# Patient Record
Sex: MALE | Race: WHITE | NOT HISPANIC OR LATINO | Employment: FULL TIME | URBAN - METROPOLITAN AREA
[De-identification: names, ages, dates, MRNs, and addresses within clinical notes are randomized per-mention and may not be internally consistent; named-entity substitution may affect disease eponyms.]

---

## 2023-04-01 DIAGNOSIS — I10 ESSENTIAL (PRIMARY) HYPERTENSION: ICD-10-CM

## 2023-04-04 RX ORDER — LISINOPRIL 20 MG/1
TABLET ORAL
Qty: 30 TABLET | Refills: 1 | Status: SHIPPED | OUTPATIENT
Start: 2023-04-04 | End: 2023-06-02

## 2023-06-02 DIAGNOSIS — I10 ESSENTIAL (PRIMARY) HYPERTENSION: ICD-10-CM

## 2023-06-02 RX ORDER — LISINOPRIL 20 MG/1
TABLET ORAL
Qty: 30 TABLET | Refills: 1 | Status: SHIPPED | OUTPATIENT
Start: 2023-06-02 | End: 2023-08-07

## 2023-08-05 DIAGNOSIS — I10 ESSENTIAL (PRIMARY) HYPERTENSION: ICD-10-CM

## 2023-08-07 RX ORDER — LISINOPRIL 20 MG/1
TABLET ORAL
Qty: 30 TABLET | Refills: 0 | Status: SHIPPED | OUTPATIENT
Start: 2023-08-07 | End: 2023-09-11

## 2023-09-11 DIAGNOSIS — I10 ESSENTIAL (PRIMARY) HYPERTENSION: ICD-10-CM

## 2023-09-11 RX ORDER — LISINOPRIL 20 MG/1
TABLET ORAL
Qty: 30 TABLET | Refills: 0 | Status: SHIPPED | OUTPATIENT
Start: 2023-09-11 | End: 2023-10-09

## 2023-09-20 ENCOUNTER — APPOINTMENT (OUTPATIENT)
Dept: PRIMARY CARE | Facility: CLINIC | Age: 48
End: 2023-09-20
Payer: COMMERCIAL

## 2023-10-04 ENCOUNTER — OFFICE VISIT (OUTPATIENT)
Dept: PRIMARY CARE | Facility: CLINIC | Age: 48
End: 2023-10-04
Payer: COMMERCIAL

## 2023-10-04 ENCOUNTER — LAB (OUTPATIENT)
Dept: LAB | Facility: LAB | Age: 48
End: 2023-10-04
Payer: COMMERCIAL

## 2023-10-04 VITALS — DIASTOLIC BLOOD PRESSURE: 88 MMHG | SYSTOLIC BLOOD PRESSURE: 124 MMHG

## 2023-10-04 DIAGNOSIS — Z23 NEED FOR INFLUENZA VACCINATION: Primary | ICD-10-CM

## 2023-10-04 DIAGNOSIS — Z13.220 LIPID SCREENING: ICD-10-CM

## 2023-10-04 DIAGNOSIS — I10 PRIMARY HYPERTENSION: ICD-10-CM

## 2023-10-04 DIAGNOSIS — R09.82 PND (POST-NASAL DRIP): ICD-10-CM

## 2023-10-04 LAB
ANION GAP SERPL CALC-SCNC: 11 MMOL/L (ref 10–20)
BUN SERPL-MCNC: 24 MG/DL (ref 6–23)
CALCIUM SERPL-MCNC: 9.1 MG/DL (ref 8.6–10.3)
CHLORIDE SERPL-SCNC: 106 MMOL/L (ref 98–107)
CHOLEST SERPL-MCNC: 179 MG/DL (ref 0–199)
CHOLESTEROL/HDL RATIO: 5.6
CO2 SERPL-SCNC: 25 MMOL/L (ref 21–32)
CREAT SERPL-MCNC: 1.28 MG/DL (ref 0.5–1.3)
GFR SERPL CREATININE-BSD FRML MDRD: 69 ML/MIN/1.73M*2
GLUCOSE SERPL-MCNC: 93 MG/DL (ref 74–99)
HDLC SERPL-MCNC: 32.2 MG/DL
LDLC SERPL CALC-MCNC: 120 MG/DL (ref 140–190)
NON HDL CHOLESTEROL: 147 MG/DL (ref 0–149)
POTASSIUM SERPL-SCNC: 4.1 MMOL/L (ref 3.5–5.3)
SODIUM SERPL-SCNC: 138 MMOL/L (ref 136–145)
TRIGL SERPL-MCNC: 132 MG/DL (ref 0–149)
VLDL: 26 MG/DL (ref 0–40)

## 2023-10-04 PROCEDURE — 36415 COLL VENOUS BLD VENIPUNCTURE: CPT

## 2023-10-04 PROCEDURE — 99214 OFFICE O/P EST MOD 30 MIN: CPT | Performed by: NURSE PRACTITIONER

## 2023-10-04 PROCEDURE — 90686 IIV4 VACC NO PRSV 0.5 ML IM: CPT | Performed by: NURSE PRACTITIONER

## 2023-10-04 PROCEDURE — 3079F DIAST BP 80-89 MM HG: CPT | Performed by: NURSE PRACTITIONER

## 2023-10-04 PROCEDURE — 90471 IMMUNIZATION ADMIN: CPT | Performed by: NURSE PRACTITIONER

## 2023-10-04 PROCEDURE — 3074F SYST BP LT 130 MM HG: CPT | Performed by: NURSE PRACTITIONER

## 2023-10-04 RX ORDER — FLUTICASONE PROPIONATE 50 MCG
2 SPRAY, SUSPENSION (ML) NASAL DAILY
Qty: 16 G | Refills: 1 | Status: SHIPPED | OUTPATIENT
Start: 2023-10-04 | End: 2024-01-02

## 2023-10-04 NOTE — PROGRESS NOTES
Subjective   Patient ID: Yoshi Wright is a 48 y.o. male who presents for No chief complaint on file..  No home B/Ps - will resume meds  Some chronic pnd, not new, will send to ENT for eval as bothersome.            Review of Systems   Constitutional:  Negative for fatigue and fever.   HENT:  Positive for postnasal drip. Negative for sinus pressure and sinus pain.    Respiratory:  Negative for cough, shortness of breath and wheezing.    Cardiovascular:  Negative for leg swelling.   Endocrine: Negative for polydipsia, polyphagia and polyuria.   Neurological:  Negative for light-headedness and headaches.       Objective   Physical Exam  Vitals reviewed.   Constitutional:       Appearance: Normal appearance.   HENT:      Nose:      Comments: Some swelling turbinates, clear pnd evident, no sinus POP  Eyes:      Extraocular Movements: Extraocular movements intact.   Cardiovascular:      Rate and Rhythm: Normal rate and regular rhythm.   Pulmonary:      Effort: Pulmonary effort is normal.      Breath sounds: Normal breath sounds.   Musculoskeletal:      Right lower leg: No edema.      Left lower leg: No edema.   Skin:     Capillary Refill: Capillary refill takes less than 2 seconds.   Neurological:      General: No focal deficit present.      Mental Status: He is alert.   Psychiatric:         Mood and Affect: Mood normal.         Assessment/Plan   Diagnoses and all orders for this visit:  Need for influenza vaccination  -     Flu vaccine (IIV4) age 6 months and greater, preservative free  Lipid screening  -     Lipid Panel; Future  Primary hypertension  -     Basic Metabolic Panel; Future  PND (post-nasal drip)  -     fluticasone (Flonase) 50 mcg/actuation nasal spray; Administer 2 sprays into each nostril once daily. Shake gently. Before first use, prime pump. After use, clean tip and replace cap.

## 2023-10-06 DIAGNOSIS — I10 ESSENTIAL (PRIMARY) HYPERTENSION: ICD-10-CM

## 2023-10-09 RX ORDER — LISINOPRIL 20 MG/1
TABLET ORAL
Qty: 90 TABLET | Refills: 1 | Status: SHIPPED | OUTPATIENT
Start: 2023-10-09 | End: 2024-04-22

## 2023-10-31 PROBLEM — I10 PRIMARY HYPERTENSION: Status: ACTIVE | Noted: 2023-10-31

## 2023-10-31 PROBLEM — R09.82 PND (POST-NASAL DRIP): Status: ACTIVE | Noted: 2023-10-31

## 2023-10-31 PROBLEM — Z23 NEED FOR INFLUENZA VACCINATION: Status: ACTIVE | Noted: 2023-10-31

## 2023-10-31 PROBLEM — Z13.220 LIPID SCREENING: Status: ACTIVE | Noted: 2023-10-31

## 2023-10-31 ASSESSMENT — ENCOUNTER SYMPTOMS
FEVER: 0
SINUS PRESSURE: 0
SINUS PAIN: 0
WHEEZING: 0
SHORTNESS OF BREATH: 0
LIGHT-HEADEDNESS: 0
FATIGUE: 0
COUGH: 0
POLYPHAGIA: 0
POLYDIPSIA: 0
HEADACHES: 0

## 2023-11-29 DIAGNOSIS — J98.01 COUGH DUE TO BRONCHOSPASM: Primary | ICD-10-CM

## 2023-11-29 RX ORDER — ALBUTEROL SULFATE 90 UG/1
2 AEROSOL, METERED RESPIRATORY (INHALATION) EVERY 4 HOURS PRN
Qty: 8 G | Refills: 5 | Status: SHIPPED | OUTPATIENT
Start: 2023-11-29 | End: 2024-11-28

## 2024-04-13 DIAGNOSIS — I10 ESSENTIAL (PRIMARY) HYPERTENSION: ICD-10-CM

## 2024-04-22 RX ORDER — LISINOPRIL 20 MG/1
TABLET ORAL
Qty: 90 TABLET | Refills: 1 | Status: SHIPPED | OUTPATIENT
Start: 2024-04-22 | End: 2024-05-22

## 2024-11-27 ENCOUNTER — APPOINTMENT (OUTPATIENT)
Dept: PRIMARY CARE | Facility: CLINIC | Age: 49
End: 2024-11-27
Payer: COMMERCIAL

## 2024-11-29 DIAGNOSIS — I10 ESSENTIAL (PRIMARY) HYPERTENSION: ICD-10-CM

## 2024-12-02 RX ORDER — LISINOPRIL 20 MG/1
TABLET ORAL
Qty: 30 TABLET | Refills: 0 | Status: SHIPPED | OUTPATIENT
Start: 2024-12-02 | End: 2025-01-01

## 2024-12-22 ENCOUNTER — OFFICE VISIT (OUTPATIENT)
Dept: URGENT CARE | Age: 49
End: 2024-12-22
Payer: COMMERCIAL

## 2024-12-22 VITALS
OXYGEN SATURATION: 98 % | HEART RATE: 67 BPM | TEMPERATURE: 98 F | RESPIRATION RATE: 16 BRPM | DIASTOLIC BLOOD PRESSURE: 70 MMHG | SYSTOLIC BLOOD PRESSURE: 120 MMHG

## 2024-12-22 DIAGNOSIS — F48.9 MENTAL HEALTH PROBLEM: Primary | ICD-10-CM

## 2024-12-24 ENCOUNTER — TELEMEDICINE (OUTPATIENT)
Dept: PRIMARY CARE | Facility: CLINIC | Age: 49
End: 2024-12-24
Payer: COMMERCIAL

## 2024-12-24 DIAGNOSIS — F32.A ANXIETY AND DEPRESSION: ICD-10-CM

## 2024-12-24 DIAGNOSIS — I10 PRIMARY HYPERTENSION: Primary | ICD-10-CM

## 2024-12-24 DIAGNOSIS — R53.83 OTHER FATIGUE: ICD-10-CM

## 2024-12-24 DIAGNOSIS — J45.20 MILD INTERMITTENT ASTHMA WITHOUT COMPLICATION (HHS-HCC): ICD-10-CM

## 2024-12-24 DIAGNOSIS — F41.9 ANXIETY AND DEPRESSION: ICD-10-CM

## 2024-12-24 PROBLEM — E55.9 VITAMIN D DEFICIENCY: Status: ACTIVE | Noted: 2024-12-24

## 2024-12-24 PROBLEM — R39.11 URINARY HESITANCY: Status: ACTIVE | Noted: 2017-02-17

## 2024-12-24 PROBLEM — E78.5 DYSLIPIDEMIA (HIGH LDL; LOW HDL): Status: ACTIVE | Noted: 2024-12-24

## 2024-12-24 PROBLEM — I95.9 HYPOTENSION: Status: ACTIVE | Noted: 2023-03-04

## 2024-12-24 PROBLEM — J45.909 ASTHMA: Status: ACTIVE | Noted: 2024-12-24

## 2024-12-24 PROCEDURE — 1036F TOBACCO NON-USER: CPT | Performed by: NURSE PRACTITIONER

## 2024-12-24 PROCEDURE — 99214 OFFICE O/P EST MOD 30 MIN: CPT | Performed by: NURSE PRACTITIONER

## 2024-12-24 RX ORDER — ESCITALOPRAM OXALATE 10 MG/1
10 TABLET ORAL DAILY
Qty: 30 TABLET | Refills: 5 | Status: SHIPPED | OUTPATIENT
Start: 2024-12-24 | End: 2025-06-22

## 2024-12-24 RX ORDER — LORAZEPAM 0.5 MG/1
0.5 TABLET ORAL DAILY PRN
Qty: 30 TABLET | Refills: 0 | Status: SHIPPED | OUTPATIENT
Start: 2024-12-24 | End: 2025-01-23

## 2024-12-24 ASSESSMENT — PATIENT HEALTH QUESTIONNAIRE - PHQ9
SUM OF ALL RESPONSES TO PHQ QUESTIONS 1-9: 20
4. FEELING TIRED OR HAVING LITTLE ENERGY: NEARLY EVERY DAY
10. IF YOU CHECKED OFF ANY PROBLEMS, HOW DIFFICULT HAVE THESE PROBLEMS MADE IT FOR YOU TO DO YOUR WORK, TAKE CARE OF THINGS AT HOME, OR GET ALONG WITH OTHER PEOPLE: VERY DIFFICULT
SUM OF ALL RESPONSES TO PHQ9 QUESTIONS 1 AND 2: 6
2. FEELING DOWN, DEPRESSED OR HOPELESS: NEARLY EVERY DAY
8. MOVING OR SPEAKING SO SLOWLY THAT OTHER PEOPLE COULD HAVE NOTICED. OR THE OPPOSITE, BEING SO FIGETY OR RESTLESS THAT YOU HAVE BEEN MOVING AROUND A LOT MORE THAN USUAL: NOT AT ALL
3. TROUBLE FALLING OR STAYING ASLEEP OR SLEEPING TOO MUCH: MORE THAN HALF THE DAYS
7. TROUBLE CONCENTRATING ON THINGS, SUCH AS READING THE NEWSPAPER OR WATCHING TELEVISION: NEARLY EVERY DAY
9. THOUGHTS THAT YOU WOULD BE BETTER OFF DEAD, OR OF HURTING YOURSELF: NOT AT ALL
6. FEELING BAD ABOUT YOURSELF - OR THAT YOU ARE A FAILURE OR HAVE LET YOURSELF OR YOUR FAMILY DOWN: NEARLY EVERY DAY
5. POOR APPETITE OR OVEREATING: NEARLY EVERY DAY
1. LITTLE INTEREST OR PLEASURE IN DOING THINGS: NEARLY EVERY DAY

## 2024-12-24 ASSESSMENT — ANXIETY QUESTIONNAIRES
2. NOT BEING ABLE TO STOP OR CONTROL WORRYING: NEARLY EVERY DAY
4. TROUBLE RELAXING: NEARLY EVERY DAY
GAD7 TOTAL SCORE: 19
3. WORRYING TOO MUCH ABOUT DIFFERENT THINGS: NEARLY EVERY DAY
1. FEELING NERVOUS, ANXIOUS, OR ON EDGE: NEARLY EVERY DAY
6. BECOMING EASILY ANNOYED OR IRRITABLE: NEARLY EVERY DAY
5. BEING SO RESTLESS THAT IT IS HARD TO SIT STILL: SEVERAL DAYS
7. FEELING AFRAID AS IF SOMETHING AWFUL MIGHT HAPPEN: NEARLY EVERY DAY

## 2024-12-24 NOTE — PROGRESS NOTES
Subjective   Patient ID: Yoshi Wright is a 49 y.o. male who presents for Discuss Medications.    Virtual or Telephone Consent    An interactive audio and video telecommunication system which permits real time communications between the patient (at the originating site) and provider (at the distant site) was utilized to provide this telehealth service.   Verbal consent was requested and obtained from Yoshi Wright on this date, 12/24/24 for a telehealth visit.     Patient went to the ER after passing a kidney stone and they recommended he see a psychiatrist, but his PCP retired, so he needs a PCP to put in the referral for psychiatry.    Patient works at [employer] as a , mostly from home.   with 2 kids who are 18 and 16. He denies panic attacks but feels like he's on the edge. He doesn't enjoy things that he used to. He has anxiety and depression always, but over the past few weeks it feels like the knob has been turned all the way up on depression, and broke it off.    Denies suicidal ideations.     He passed the kidney stone early Friday and was in the ER on Sunday. They did give him meds for constipation, but he was able to have a bowel movement on his own.     HPI     Review of Systems   Constitutional:  Negative for chills, fatigue and fever.   HENT:  Negative for congestion, ear pain, rhinorrhea, sinus pressure and sore throat.    Eyes:  Negative for pain, discharge and itching.   Respiratory:  Negative for cough, shortness of breath and wheezing.    Cardiovascular:  Negative for chest pain and palpitations.   Gastrointestinal:  Negative for constipation, diarrhea, nausea and vomiting.   Genitourinary:  Negative for difficulty urinating and dysuria.   Musculoskeletal:  Negative for back pain, joint swelling and myalgias.   Skin:  Negative for color change.   Neurological:  Negative for headaches.   Hematological:  Negative for adenopathy.   Psychiatric/Behavioral:  Positive for  sleep disturbance. Negative for decreased concentration, self-injury and suicidal ideas. The patient is nervous/anxious.         Depression       Objective   There were no vitals taken for this visit.    Physical Exam  Constitutional:       Appearance: Normal appearance.   Neurological:      Mental Status: He is alert.   Psychiatric:         Mood and Affect: Mood is depressed. Mood is not anxious. Affect is labile and flat. Affect is not tearful.         Speech: Speech normal.         Behavior: Behavior normal.         Thought Content: Thought content normal.         Cognition and Memory: Cognition and memory normal.         Assessment/Plan   Problem List Items Addressed This Visit       Primary hypertension - Primary    Relevant Orders    CBC and Auto Differential    Comprehensive Metabolic Panel    Asthma     12.24.24- well controlled with prn albuterol, will continue to monitor.         Fatigue    Relevant Orders    Tsh With Reflex To Free T4 If Abnormal     Other Visit Diagnoses       Anxiety and depression        Relevant Medications    escitalopram (Lexapro) 10 mg tablet    LORazepam (Ativan) 0.5 mg tablet    Other Relevant Orders    Referral to Psychiatry          Patient Instructions   Patient to start taking lexapro daily as ordered, and xanax as needed. Referred to psychiatry. Follow-up in 6-8 weeks, or sooner if needed. Call the office if any problems or concerns in the meantime. Encouraged to go to the ER if symptoms worsen or become life threatening.       This visit was completed via telehealth due to the restrictions of the COVID-19 pandemic. All issues as below were discussed and addressed but no physical exam was performed. If it was felt that the patient should be evaluated in clinic then they were directed there. The patient verbally consented to visit.  Spent 20 minutes with pt face to face and more than 50% of this time was spent in counseling and coordination of care.

## 2024-12-26 ENCOUNTER — TELEPHONE (OUTPATIENT)
Dept: OTHER | Age: 49
End: 2024-12-26
Payer: COMMERCIAL

## 2024-12-26 ASSESSMENT — ENCOUNTER SYMPTOMS
CONFUSION: 0
FEVER: 0
WEAKNESS: 0
VOMITING: 0
SEIZURES: 0
SHORTNESS OF BREATH: 0
APPETITE CHANGE: 1
NERVOUS/ANXIOUS: 1
HALLUCINATIONS: 0

## 2024-12-26 NOTE — PROGRESS NOTES
Subjective   Patient ID: Yoshi Wright is a 49 y.o. male. They present today with a chief complaint of Believes meds are causing mental disturbance  (Pt was seen in ER for kidney stone and says Bactrim may be causing rasing mind.).    HISTORY OF PRESENT ILLNESS:    Patient presents to the clinic with wife for psychiatric concerns. Patient states that for about 1-2 weeks, he has had worrisome thoughts, crying spells, decreased appetite from his baseline, and has generally felt sad and not like himself. These symptoms all intensified a couple of days ago following an ED visit for nephrolithiasis/hydronephrosis, left-sided. Patient is currently on Flomax and Bactrim and is being followed by urology. He had been prescribed Toradol and oxycodone for the pain (last dose of oxycodone 3 days ago). Pt and wife are wondering if these symptoms could be an adverse effect of one of the medications that he is taking. His only daily prescription medication is lisinopril for blood pressure. The mental/behavioral change has been very out of the ordinary for him. Pt denies psychiatric hx. He does not currently speak with a counselor or psychiatrist. The pt's PCP is currently out of town for the holidays.    Past Medical History  Allergies as of 12/22/2024    (No Known Allergies)       Current Outpatient Medications   Medication Instructions    albuterol (Ventolin HFA) 90 mcg/actuation inhaler 2 puffs, inhalation, Every 4 hours PRN    escitalopram (LEXAPRO) 10 mg, oral, Daily, TAKE 1/2 TAB DAILY x 10 DAYS, THEN INCREASE 1 TAB AFTERWARDS.    fluticasone (Flonase) 50 mcg/actuation nasal spray 2 sprays, Each Nostril, Daily, Shake gently. Before first use, prime pump. After use, clean tip and replace cap.    lisinopril 20 mg tablet TAKE 1 TABLET BY MOUTH EVERY DAY AS DIRECTED    LORazepam (ATIVAN) 0.5 mg, oral, Daily PRN         Past Medical History:   Diagnosis Date    Asthma     Personal history of other diseases of the respiratory  system     History of sinusitis    Personal history of pneumonia (recurrent)     History of pneumonia    Varicella        Past Surgical History:   Procedure Laterality Date    OTHER SURGICAL HISTORY  10/11/2021    Thyroid Surgery Excision Of Cyst    OTHER SURGICAL HISTORY  10/11/2021    Vasectomy    TONSILLECTOMY  10/11/2021    Tonsillectomy    VASECTOMY          reports that he has never smoked. He has never used smokeless tobacco. He reports that he does not currently use alcohol. He reports that he does not use drugs.    Review of Systems    Review of Systems   Constitutional:  Positive for appetite change. Negative for fever.   Eyes:  Negative for visual disturbance.   Respiratory:  Negative for shortness of breath.    Cardiovascular:  Negative for chest pain.   Gastrointestinal:  Negative for vomiting.   Neurological:  Negative for seizures, syncope and weakness.   Psychiatric/Behavioral:  Negative for confusion, hallucinations, self-injury and suicidal ideas. The patient is nervous/anxious.                                  Objective    Vitals:    12/22/24 1952   BP: 120/70   Pulse: 67   Resp: 16   Temp: 36.7 °C (98 °F)   SpO2: 98%     No LMP for male patient.  PHYSICAL EXAMINATION:    Physical Exam  Vitals and nursing note reviewed.   Constitutional:       General: He is not in acute distress.     Appearance: Normal appearance. He is not ill-appearing.   HENT:      Head: Normocephalic and atraumatic.      Nose: Nose normal.   Eyes:      General:         Right eye: No discharge.         Left eye: No discharge.      Extraocular Movements: Extraocular movements intact.      Conjunctiva/sclera: Conjunctivae normal.   Cardiovascular:      Rate and Rhythm: Normal rate.   Pulmonary:      Effort: Pulmonary effort is normal. No respiratory distress.   Musculoskeletal:      Cervical back: Normal range of motion and neck supple.   Skin:     General: Skin is warm and dry.   Neurological:      General: No focal deficit  present.      Mental Status: He is alert and oriented to person, place, and time.      GCS: GCS eye subscore is 4. GCS verbal subscore is 5. GCS motor subscore is 6.      Gait: Gait normal.   Psychiatric:         Attention and Perception: Attention normal.         Speech: Speech normal.         Behavior: Behavior is cooperative.         Thought Content: Thought content does not include homicidal or suicidal ideation.          Procedures    No results found for this visit on 12/22/24.    Diagnostic study results (if any) were reviewed by Radha Delgadillo PA-C.    Assessment/Plan   Allergies, medications, history, and pertinent labs/EKGs/Imaging reviewed by Radha Delgadillo PA-C.     Orders and Diagnoses  Diagnoses and all orders for this visit:  Mental health problem      Medical Admin Record    Patient presents to clinic with  in stable condition for concern over possible medication side effect following recent ED visit.     Given nature of chief complaint and lack of appropriate resources in the urgent care, there is indication for further emergent testing/intervention at this time.     Discussed with the patient my clinical thoughts at this time given the above and we had a shared decision-making conversation in a patient-centered decision-making model on how to proceed forward. Pt was instructed to proceed to the nearest ED for further evaluation. They were told that an urgent care diagnosis is often a preliminary impression and that definitive care is not able to be given in the urgent care setting for this type of issue. Pt was educated that a higher level of care is essential for good health and good outcomes. Patient was provided with the following instructions:     Please proceed to the nearest ED immediately after this visit for further management.     Clinical concerns as well as limitations of available testing/examination, all discussed with patient and wife. They are comfortable with the present  assessment and plan to be evaluated in the ED immediately after this visit. EMS not required at this time. Discussed with them traveling to the ED via private vehicle immediately after leaving our clinic today (wife will drive); they agree, understand, and are comfortable with this plan. Pt given the opportunity to ask questions prior to discharge and all questions were answered at this time. Patient discharged in stable condition.    Follow Up Instructions  No follow-ups on file.    Patient disposition: ED    Electronically signed by Radha Delgadillo PA-C  10:23 AM

## 2024-12-30 ASSESSMENT — ENCOUNTER SYMPTOMS
WHEEZING: 0
PALPITATIONS: 0
DYSURIA: 0
EYE ITCHING: 0
NERVOUS/ANXIOUS: 1
JOINT SWELLING: 0
SLEEP DISTURBANCE: 1
SORE THROAT: 0
BACK PAIN: 0
HEADACHES: 0
COUGH: 0
SINUS PRESSURE: 0
DIFFICULTY URINATING: 0
DECREASED CONCENTRATION: 0
DIARRHEA: 0
RHINORRHEA: 0
VOMITING: 0
NAUSEA: 0
EYE PAIN: 0
FATIGUE: 0
CHILLS: 0
COLOR CHANGE: 0
MYALGIAS: 0
CONSTIPATION: 0
SHORTNESS OF BREATH: 0
FEVER: 0
ADENOPATHY: 0
EYE DISCHARGE: 0

## 2024-12-31 NOTE — PATIENT INSTRUCTIONS
Patient to start taking lexapro daily as ordered, and xanax as needed. Referred to psychiatry. Follow-up in 6-8 weeks, or sooner if needed. Call the office if any problems or concerns in the meantime. Encouraged to go to the ER if symptoms worsen or become life threatening.

## 2025-01-06 ENCOUNTER — APPOINTMENT (OUTPATIENT)
Dept: PRIMARY CARE | Facility: CLINIC | Age: 50
End: 2025-01-06
Payer: COMMERCIAL

## 2025-01-06 DIAGNOSIS — F32.A ANXIETY AND DEPRESSION: ICD-10-CM

## 2025-01-06 DIAGNOSIS — F41.9 ANXIETY AND DEPRESSION: ICD-10-CM

## 2025-01-06 DIAGNOSIS — I10 ESSENTIAL (PRIMARY) HYPERTENSION: ICD-10-CM

## 2025-01-06 PROBLEM — Z23 NEED FOR INFLUENZA VACCINATION: Status: RESOLVED | Noted: 2023-10-31 | Resolved: 2025-01-06

## 2025-01-06 PROCEDURE — 1036F TOBACCO NON-USER: CPT | Performed by: NURSE PRACTITIONER

## 2025-01-06 PROCEDURE — 99213 OFFICE O/P EST LOW 20 MIN: CPT | Performed by: NURSE PRACTITIONER

## 2025-01-06 RX ORDER — LISINOPRIL 20 MG/1
20 TABLET ORAL DAILY
Qty: 90 TABLET | Refills: 1 | Status: SHIPPED | OUTPATIENT
Start: 2025-01-06 | End: 2025-07-05

## 2025-01-06 RX ORDER — LORAZEPAM 0.5 MG/1
0.5 TABLET ORAL DAILY PRN
Qty: 30 TABLET | Refills: 2 | Status: SHIPPED | OUTPATIENT
Start: 2025-01-06 | End: 2025-04-06

## 2025-01-06 RX ORDER — ESCITALOPRAM OXALATE 20 MG/1
20 TABLET ORAL DAILY
Qty: 30 TABLET | Refills: 5 | Status: SHIPPED | OUTPATIENT
Start: 2025-01-06 | End: 2025-07-05

## 2025-01-06 ASSESSMENT — ENCOUNTER SYMPTOMS
RHINORRHEA: 0
COLOR CHANGE: 0
NERVOUS/ANXIOUS: 1
WHEEZING: 0
JOINT SWELLING: 0
DEPRESSION: 1
HEADACHES: 0
ADENOPATHY: 0
SHORTNESS OF BREATH: 0
FATIGUE: 0
IRRITABILITY: 1
COUGH: 0
SINUS PRESSURE: 0
DECREASED CONCENTRATION: 0
ANHEDONIA: 1
SORE THROAT: 0
MYALGIAS: 0
SLEEP DISTURBANCE: 1
BACK PAIN: 0
DEPRESSED MOOD: 1

## 2025-01-06 NOTE — PROGRESS NOTES
Subjective   Patient ID: Yoshi Wright is a 49 y.o. male who presents for virtual med check for depression and anxiety.    Virtual or Telephone Consent    An interactive audio and video telecommunication system which permits real time communications between the patient (at the originating site) and provider (at the distant site) was utilized to provide this telehealth service.   Verbal consent was requested and obtained from Yoshi Wright on this date, 01/10/25 for a telehealth visit.     Depression  Visit Type: follow-up  Patient presents with the following symptoms: anhedonia, depressed mood, fatigue, irritability and nervousness/anxiety.  Patient is not experiencing: decreased concentration, shortness of breath, suicidal ideas and suicidal planning.  Frequency of symptoms: most days    Compliance with medications:  %  Treatment side effects: none.  Anxiety  Symptoms include depressed mood, irritability and nervous/anxious behavior. Patient reports no decreased concentration, shortness of breath or suicidal ideas.       Patient states he made it through the holidays, and noticed a slight improvement in symptoms, no side effects with meds. Taking lexapro 10mg in the morning, and ativan pretty much every day.     Review of Systems   Constitutional:  Positive for irritability. Negative for fatigue.   HENT:  Negative for congestion, ear pain, rhinorrhea, sinus pressure and sore throat.    Respiratory:  Negative for cough, shortness of breath and wheezing.    Musculoskeletal:  Negative for back pain, joint swelling and myalgias.   Skin:  Negative for color change.   Neurological:  Negative for headaches.   Hematological:  Negative for adenopathy.   Psychiatric/Behavioral:  Positive for depression and sleep disturbance. Negative for decreased concentration and suicidal ideas. The patient is nervous/anxious.        Objective     There were no vitals taken for this visit.     Physical Exam  Constitutional:        Appearance: Normal appearance.   Neurological:      Mental Status: He is alert.   Psychiatric:         Attention and Perception: Attention normal.         Mood and Affect: Mood is depressed. Mood is not anxious. Affect is flat. Affect is not labile or tearful.         Speech: Speech normal.         Behavior: Behavior normal.         Thought Content: Thought content normal.         Cognition and Memory: Cognition normal.         Judgment: Judgment normal.         Assessment/Plan   Problem List Items Addressed This Visit    None  Visit Diagnoses       Anxiety and depression        Relevant Medications    escitalopram (Lexapro) 20 mg tablet    LORazepam (Ativan) 0.5 mg tablet    Essential (primary) hypertension        Relevant Medications    lisinopril 20 mg tablet            Patient Instructions   Increased lexapro to 20mg every day. Continue all other meds as ordered. Will follow-up with psychiatry. Follow-up with me in 3-6 months for physical, or sooner if needed. Call the office if any problems or concerns in the meantime.      This visit was completed via telehealth due to the restrictions of the COVID-19 pandemic. All issues as below were discussed and addressed but no physical exam was performed. If it was felt that the patient should be evaluated in clinic then they were directed there. The patient verbally consented to visit.  Spent 10 minutes with pt face to face and more than 50% of this time was spent in counseling and coordination of care.

## 2025-01-10 NOTE — PATIENT INSTRUCTIONS
Increased lexapro to 20mg every day. Continue all other meds as ordered. Will follow-up with psychiatry. Follow-up with me in 3-6 months for physical, or sooner if needed. Call the office if any problems or concerns in the meantime.

## 2025-02-04 ENCOUNTER — APPOINTMENT (OUTPATIENT)
Dept: PRIMARY CARE | Facility: CLINIC | Age: 50
End: 2025-02-04
Payer: COMMERCIAL

## 2025-02-18 ENCOUNTER — APPOINTMENT (OUTPATIENT)
Dept: BEHAVIORAL HEALTH | Facility: CLINIC | Age: 50
End: 2025-02-18
Payer: COMMERCIAL

## 2025-02-25 ENCOUNTER — APPOINTMENT (OUTPATIENT)
Dept: BEHAVIORAL HEALTH | Facility: CLINIC | Age: 50
End: 2025-02-25
Payer: COMMERCIAL

## 2025-02-25 DIAGNOSIS — Z79.899 MEDICATION MANAGEMENT: ICD-10-CM

## 2025-02-25 DIAGNOSIS — F41.1 GAD (GENERALIZED ANXIETY DISORDER): Primary | ICD-10-CM

## 2025-02-25 DIAGNOSIS — F33.1 MODERATE EPISODE OF RECURRENT MAJOR DEPRESSIVE DISORDER: ICD-10-CM

## 2025-02-25 PROCEDURE — 99204 OFFICE O/P NEW MOD 45 MIN: CPT | Performed by: REGISTERED NURSE

## 2025-02-25 PROCEDURE — 1036F TOBACCO NON-USER: CPT | Performed by: REGISTERED NURSE

## 2025-02-25 RX ORDER — MIRTAZAPINE 15 MG/1
15 TABLET, FILM COATED ORAL NIGHTLY
Qty: 90 TABLET | Refills: 0 | Status: SHIPPED | OUTPATIENT
Start: 2025-02-25 | End: 2025-05-26

## 2025-02-25 ASSESSMENT — ANXIETY QUESTIONNAIRES
4. TROUBLE RELAXING: MORE THAN HALF THE DAYS
1. FEELING NERVOUS, ANXIOUS, OR ON EDGE: NEARLY EVERY DAY
6. BECOMING EASILY ANNOYED OR IRRITABLE: SEVERAL DAYS
IF YOU CHECKED OFF ANY PROBLEMS ON THIS QUESTIONNAIRE, HOW DIFFICULT HAVE THESE PROBLEMS MADE IT FOR YOU TO DO YOUR WORK, TAKE CARE OF THINGS AT HOME, OR GET ALONG WITH OTHER PEOPLE: VERY DIFFICULT
2. NOT BEING ABLE TO STOP OR CONTROL WORRYING: MORE THAN HALF THE DAYS
5. BEING SO RESTLESS THAT IT IS HARD TO SIT STILL: SEVERAL DAYS
GAD7 TOTAL SCORE: 15
7. FEELING AFRAID AS IF SOMETHING AWFUL MIGHT HAPPEN: NEARLY EVERY DAY
3. WORRYING TOO MUCH ABOUT DIFFERENT THINGS: NEARLY EVERY DAY

## 2025-02-25 ASSESSMENT — PATIENT HEALTH QUESTIONNAIRE - PHQ9
6. FEELING BAD ABOUT YOURSELF - OR THAT YOU ARE A FAILURE OR HAVE LET YOURSELF OR YOUR FAMILY DOWN: MORE THAN HALF THE DAYS
4. FEELING TIRED OR HAVING LITTLE ENERGY: MORE THAN HALF THE DAYS
2. FEELING DOWN, DEPRESSED OR HOPELESS: NEARLY EVERY DAY
10. IF YOU CHECKED OFF ANY PROBLEMS, HOW DIFFICULT HAVE THESE PROBLEMS MADE IT FOR YOU TO DO YOUR WORK, TAKE CARE OF THINGS AT HOME, OR GET ALONG WITH OTHER PEOPLE: VERY DIFFICULT
5. POOR APPETITE OR OVEREATING: SEVERAL DAYS
8. MOVING OR SPEAKING SO SLOWLY THAT OTHER PEOPLE COULD HAVE NOTICED. OR THE OPPOSITE, BEING SO FIGETY OR RESTLESS THAT YOU HAVE BEEN MOVING AROUND A LOT MORE THAN USUAL: NOT AT ALL
9. THOUGHTS THAT YOU WOULD BE BETTER OFF DEAD, OR OF HURTING YOURSELF: NOT AT ALL
3. TROUBLE FALLING OR STAYING ASLEEP: SEVERAL DAYS
7. TROUBLE CONCENTRATING ON THINGS, SUCH AS READING THE NEWSPAPER OR WATCHING TELEVISION: SEVERAL DAYS
1. LITTLE INTEREST OR PLEASURE IN DOING THINGS: NEARLY EVERY DAY
SUM OF ALL RESPONSES TO PHQ QUESTIONS 1-9: 13
SUM OF ALL RESPONSES TO PHQ9 QUESTIONS 1 & 2: 6

## 2025-02-25 NOTE — PROGRESS NOTES
"Virtual or Telephone Consent    An interactive audio and video telecommunication system which permits real time communications between the patient (at the originating site) and provider (at the distant site) was utilized to provide this telehealth service.   Verbal consent was requested and obtained from Yoshi Wright on this date, 02/25/25 for a telehealth visit.     HPI  Yoshi Wright is a 49 y.o. male patient with a chief complaint of   Chief Complaint   Patient presents with    Anxiety    Depression    Med Management    presenting to outpatient treatment for a scheduled psych outpatient psychiatric evaluation.    Yoshi \"Maurice\" explains that symptoms of anxiety and depression began in 2024.   Patient explains that during this time \"I was having stressors with work and political issues in the world. I usually have depression around Katie time but I don't know why\". Patient also states that he was diagnosed with a kidney stone around December 2024. Maurice explained symptoms to PCP and was prescribed Lexapro and Ativan for management of symptoms.    The duration of symptoms occurred for 1 year.   Aggravating factors of anxiety and depression are life's stressors.  Relieving factors of anxiety and depression are reading.  Patient ranks the severity of anxiety using the AUBRIE 7 scale with a rating of 15 for severe anxiety symptoms. Patient ranks the severity of depression using the PHQ 9 scale with a rating of 13 for moderate depressive symptoms. Patient does explain that current psychiatric medication regimen has been successful in the management of anxiety and depressive symptoms.     NOTE: Symptom scale is rated where 0 = no symptoms at all, and 10 = symptoms so severe that pt is an imminent danger to themselves or others and requires hospitalization.    Anxiety and Depression remain(s) present more days than not, which has improved over the past few weeks. Yoshi Wright rates the severity of " psych symptoms as a 6/10, noting symptom improvement with reading and worsening of symptoms with life's stressors.    Psychiatric Review Of Systems:  -Mood:  Depressive Symptoms: appetite decreased, concentration, energy, guilt, helpless, hopeless, and interest  Manic Symptoms: negative  Anxiety Symptoms: General Anxiety Disorder (AUBRIE)AUBRIE Behaviors: difficult to control worry, excessive anxiety/worry, and difficulty concentrating  Psychotic Symptoms: negative  Other Symptoms:  -Sleep/Energy/Motivation: Sleep is good. Energy and motivation is poor  -Appetite/Weight Changes: Appetite is poor. No weight changes  -Psychosis: denies  -SI/HI: denies      HISTORY  PSYCH HISTORY  -Psych Hx: AUBRIE, MDD  -Psych Hospitalization Hx: denies  -Suicide Attempt Hx: denies  -Self-Harm/Violence Hx: denies  -Current psych meds: Lexapro, Ativan  -Psych Med Hx: n/a    SUBSTANCE USE HISTORY  -Substance Use Hx: denies  -Alcohol: 1 drink monthly  -Tobacco: denies  -Caffeine: 2 cups coffee daily  -Substance Abuse Treatment Hx: denies    FAMILY HISTORY  -Family Psych Hx: sister (OCD)  -Family Suicide Hx: paternal grandmother  -Family Substance Abuse Hx: denies    SOCIAL HISTORY  -Supports: wife  -Housing: lives in house with wife and children  -Income:   -Education: Bachelor's Degree  -Legal: denies  -Abuse Hx: denies    Past Medical History:   Diagnosis Date    Asthma     Personal history of other diseases of the respiratory system     History of sinusitis    Personal history of pneumonia (recurrent)     History of pneumonia    Varicella        -PCP: ACE Cherry-CNP    -TBI/head trauma/LOC/seizure hx: denies    REVIEW OF SYSTEMS  Review of Systems   All other systems reviewed and are negative.    Objective   Physical Exam  Psychiatric:         Attention and Perception: Attention and perception normal.         Mood and Affect: Mood and affect normal.         Speech: Speech normal.         Behavior: Behavior normal.  Behavior is cooperative.         Thought Content: Thought content normal.         Cognition and Memory: Cognition and memory normal.         Judgment: Judgment normal.           MEDICAL-DECISION MAKING  Continue Lexapro 20mg PO Daily for anxiety and depression. Begin Mirtazapine 15mg PO HS for anxiety and depression  Patient educated and verbalized understanding on benefits, risks, and side effects of all psychiatric medications. Outside referrals for psychotherapy sent to patient via [facility]. Follow-up with psychiatry in 4 weeks for medication evaluation and effectiveness.        Psych med regimen as follows:   1. Lexapro 20mg PO Daily   2. Mirtazapine 15mg PO HS    IMPRESSION  - AUBIRE  - MDD, moderate severity  - Medication Management    SI/HI ASSESSMENT  -Risk Assessment: The pt is currently a low acute risk of suicide and self-harm due to no past suicide attempt(s) and not currently endorsing thoughts of suicide. The pt is currently a low acute risk of violence and harm to others due to no past history of violence and not currently threatening others.  -Suicidal Risk Factors: , male, chronic medical illness, and current psychiatric illness  -Violence Risk Factors: male  -Protective Factors: strong coping skills, marriage/partnership, and employment  -Plan to Reduce Risk: Establish medication regimen, outpatient follow-up care, and increase coping skills   Denied current suicidal ideation or thoughts of harm to self, denied homicidal ideation or thoughts of harm to others. No delusional thinking elicited. No perseverations or obsessions identified.       PLAN  -Continue Medications as directed.  -Follow-up with this provider in 4 weeks.  -Risks/benefits/assessment of medication interventions discussed with pt; pt agreeable to plan. Will continue to monitor for symptoms mgmt and SEs and adjust plan as needed.  -MI to increase coping skills/behavior regulation.  -Safety plan reviewed.  -Call  Psychiatry at  [phone] with issues.  -For [facility] residents, Mobile Crisis is a 24/7 hotline you can call for assistance at [phone]. Please call 911 or go to your closest Emergency Room if you feel worse. This includes thoughts of hurting yourself or anyone else, or having other troubles such as hearing voices, seeing visions, or having new and scary thoughts about the people around you.    Reviewed OARRS on [02/25/25] by [MATT Garza] -OARRS has been reviewed and is consistent with prescribed medications, Considered the risks of abuse, dependence, addiction and diversion, Medication is felt to be clinically appropriate based on documented diagnosis.    No visits with results within 6 Month(s) from this visit.   Latest known visit with results is:   Lab on 10/04/2023   Component Date Value    Glucose 10/04/2023 93     Sodium 10/04/2023 138     Potassium 10/04/2023 4.1     Chloride 10/04/2023 106     Bicarbonate 10/04/2023 25     Anion Gap 10/04/2023 11     Urea Nitrogen 10/04/2023 24 (H)     Creatinine 10/04/2023 1.28     eGFR 10/04/2023 69     Calcium 10/04/2023 9.1     Cholesterol 10/04/2023 179     HDL-Cholesterol 10/04/2023 32.2     Cholesterol/HDL Ratio 10/04/2023 5.6     LDL Calculated 10/04/2023 120 (L)     VLDL 10/04/2023 26     Triglycerides 10/04/2023 132     Non HDL Cholesterol 10/04/2023 147        Last Urine Drug Screen / ordered today: No  No results found for this or any previous visit (from the past 8760 hours).  N/A          MATT Garza

## 2025-04-15 ENCOUNTER — APPOINTMENT (OUTPATIENT)
Dept: BEHAVIORAL HEALTH | Facility: CLINIC | Age: 50
End: 2025-04-15
Payer: COMMERCIAL

## 2025-05-23 DIAGNOSIS — F33.1 MODERATE EPISODE OF RECURRENT MAJOR DEPRESSIVE DISORDER: ICD-10-CM

## 2025-05-23 DIAGNOSIS — F41.1 GAD (GENERALIZED ANXIETY DISORDER): ICD-10-CM

## 2025-05-23 RX ORDER — MIRTAZAPINE 15 MG/1
15 TABLET, FILM COATED ORAL NIGHTLY
Qty: 90 TABLET | Refills: 0 | OUTPATIENT
Start: 2025-05-23 | End: 2025-08-21

## 2025-06-26 DIAGNOSIS — F41.1 GAD (GENERALIZED ANXIETY DISORDER): ICD-10-CM

## 2025-06-26 DIAGNOSIS — F33.1 MODERATE EPISODE OF RECURRENT MAJOR DEPRESSIVE DISORDER: ICD-10-CM

## 2025-06-27 RX ORDER — MIRTAZAPINE 15 MG/1
15 TABLET, FILM COATED ORAL NIGHTLY
Qty: 90 TABLET | Refills: 0 | OUTPATIENT
Start: 2025-06-27 | End: 2025-09-25

## 2025-08-05 DIAGNOSIS — I10 ESSENTIAL (PRIMARY) HYPERTENSION: ICD-10-CM

## 2025-08-07 RX ORDER — LISINOPRIL 20 MG/1
20 TABLET ORAL DAILY
Qty: 90 TABLET | Refills: 1 | Status: SHIPPED | OUTPATIENT
Start: 2025-08-07 | End: 2026-02-03

## 2025-08-20 DIAGNOSIS — F41.9 ANXIETY AND DEPRESSION: ICD-10-CM

## 2025-08-20 DIAGNOSIS — F32.A ANXIETY AND DEPRESSION: ICD-10-CM

## 2025-08-21 RX ORDER — ESCITALOPRAM OXALATE 20 MG/1
20 TABLET ORAL DAILY
Qty: 90 TABLET | Refills: 0 | Status: SHIPPED | OUTPATIENT
Start: 2025-08-21 | End: 2025-11-19